# Patient Record
Sex: FEMALE | Race: WHITE | NOT HISPANIC OR LATINO | Employment: OTHER
[De-identification: names, ages, dates, MRNs, and addresses within clinical notes are randomized per-mention and may not be internally consistent; named-entity substitution may affect disease eponyms.]

---

## 2017-11-01 ENCOUNTER — RECORDS - HEALTHEAST (OUTPATIENT)
Dept: ADMINISTRATIVE | Facility: OTHER | Age: 52
End: 2017-11-01

## 2017-11-06 ENCOUNTER — HOSPITAL ENCOUNTER (OUTPATIENT)
Dept: NUCLEAR MEDICINE | Facility: HOSPITAL | Age: 52
Discharge: HOME | End: 2017-11-06
Attending: ORTHOPAEDIC SURGERY

## 2017-11-06 DIAGNOSIS — R07.81 RIB PAIN: ICD-10-CM

## 2018-05-23 ENCOUNTER — TRANSFERRED RECORDS (OUTPATIENT)
Dept: HEALTH INFORMATION MANAGEMENT | Facility: CLINIC | Age: 53
End: 2018-05-23

## 2018-05-26 ENCOUNTER — TRANSFERRED RECORDS (OUTPATIENT)
Dept: HEALTH INFORMATION MANAGEMENT | Facility: CLINIC | Age: 53
End: 2018-05-26

## 2018-05-29 ENCOUNTER — TRANSFERRED RECORDS (OUTPATIENT)
Dept: HEALTH INFORMATION MANAGEMENT | Facility: CLINIC | Age: 53
End: 2018-05-29

## 2018-07-02 ENCOUNTER — TELEPHONE (OUTPATIENT)
Dept: ORTHOPEDICS | Facility: CLINIC | Age: 53
End: 2018-07-02

## 2018-07-02 NOTE — TELEPHONE ENCOUNTER
Melony called in wanting to schedule with . She states that she has had multiple compression fractures. She has seen  and he is referring to  for osteoporosis. She reports never having a DEXA but will bring MRI imaging prior to her appointment.

## 2018-07-07 NOTE — TELEPHONE ENCOUNTER
FUTURE VISIT INFORMATION      FUTURE VISIT INFORMATION:    Date: 7/12/18    Time: 1:00    Location:   REFERRAL INFORMATION:    Referring provider:  MIKKI GARLAND    Referring providers clinic:  [facility]    Reason for visit/diagnosis: Osteoporosis    RECORDS REQUESTED FROM:       Clinic name Comments Records Status Imaging Status   [facility] REQUESTED Records recevied                                    RECORDS STATUS

## 2018-07-12 ENCOUNTER — RADIANT APPOINTMENT (OUTPATIENT)
Dept: GENERAL RADIOLOGY | Facility: CLINIC | Age: 53
End: 2018-07-12
Attending: FAMILY MEDICINE
Payer: COMMERCIAL

## 2018-07-12 ENCOUNTER — OFFICE VISIT (OUTPATIENT)
Dept: ORTHOPEDICS | Facility: CLINIC | Age: 53
End: 2018-07-12
Payer: COMMERCIAL

## 2018-07-12 ENCOUNTER — PRE VISIT (OUTPATIENT)
Dept: ORTHOPEDICS | Facility: CLINIC | Age: 53
End: 2018-07-12

## 2018-07-12 VITALS — WEIGHT: 156 LBS | BODY MASS INDEX: 24.48 KG/M2 | RESPIRATION RATE: 16 BRPM | HEIGHT: 67 IN

## 2018-07-12 DIAGNOSIS — Z87.11 HX OF GASTRIC ULCER: ICD-10-CM

## 2018-07-12 DIAGNOSIS — Z13.21 ENCOUNTER FOR VITAMIN DEFICIENCY SCREENING: ICD-10-CM

## 2018-07-12 DIAGNOSIS — S22.000A CLOSED COMPRESSION FRACTURE OF THORACIC VERTEBRA, INITIAL ENCOUNTER (H): ICD-10-CM

## 2018-07-12 DIAGNOSIS — Z13.21 ENCOUNTER FOR VITAMIN DEFICIENCY SCREENING: Primary | ICD-10-CM

## 2018-07-12 DIAGNOSIS — R79.89 LOW THYROID STIMULATING HORMONE (TSH) LEVEL: ICD-10-CM

## 2018-07-12 LAB
ANION GAP SERPL CALCULATED.3IONS-SCNC: 6 MMOL/L (ref 3–14)
BUN SERPL-MCNC: 13 MG/DL (ref 7–30)
CALCIUM SERPL-MCNC: 9.8 MG/DL (ref 8.5–10.1)
CHLORIDE SERPL-SCNC: 105 MMOL/L (ref 94–109)
CO2 SERPL-SCNC: 28 MMOL/L (ref 20–32)
CREAT SERPL-MCNC: 0.59 MG/DL (ref 0.52–1.04)
ERYTHROCYTE [DISTWIDTH] IN BLOOD BY AUTOMATED COUNT: 12.3 % (ref 10–15)
GFR SERPL CREATININE-BSD FRML MDRD: >90 ML/MIN/1.7M2
GLUCOSE SERPL-MCNC: 97 MG/DL (ref 70–99)
HCT VFR BLD AUTO: 38.8 % (ref 35–47)
HGB BLD-MCNC: 12.6 G/DL (ref 11.7–15.7)
MCH RBC QN AUTO: 29.4 PG (ref 26.5–33)
MCHC RBC AUTO-ENTMCNC: 32.5 G/DL (ref 31.5–36.5)
MCV RBC AUTO: 91 FL (ref 78–100)
PLATELET # BLD AUTO: 359 10E9/L (ref 150–450)
POTASSIUM SERPL-SCNC: 4.4 MMOL/L (ref 3.4–5.3)
PTH-INTACT SERPL-MCNC: 19 PG/ML (ref 18–80)
RBC # BLD AUTO: 4.28 10E12/L (ref 3.8–5.2)
SODIUM SERPL-SCNC: 140 MMOL/L (ref 133–144)
TSH SERPL DL<=0.005 MIU/L-ACNC: 0.04 MU/L (ref 0.4–4)
WBC # BLD AUTO: 5.2 10E9/L (ref 4–11)

## 2018-07-12 RX ORDER — CALCITONIN SALMON 200 [IU]/.09ML
1 SPRAY, METERED NASAL DAILY
Qty: 1 BOTTLE | Refills: 1 | Status: SHIPPED | OUTPATIENT
Start: 2018-07-12 | End: 2019-04-18

## 2018-07-12 RX ORDER — HYDROCODONE BITARTRATE AND ACETAMINOPHEN 5; 325 MG/1; MG/1
1 TABLET ORAL EVERY 4 HOURS PRN
Qty: 18 TABLET | Refills: 0 | Status: SHIPPED | OUTPATIENT
Start: 2018-07-12 | End: 2018-08-02

## 2018-07-12 NOTE — PATIENT INSTRUCTIONS
Use the IOF calculator: ([employer] iofcalciumcalculator) to check on your calcium    You should start taking 1000IU of Muriel D daily    Calcitonin nasal spray can help with pain in your back, its a nasal spray do one spray each day (one nostril one day, other next day)     Labs downstairs and schedule the DXA scan.    Come back for a visit to discuss our plan and lab results.

## 2018-07-12 NOTE — Clinical Note
I was going to add on T3 and T4 but the lab specimen was thrown after 3 days. I think that is what I can check on with her next visit?
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
## 2018-07-12 NOTE — MR AVS SNAPSHOT
After Visit Summary   7/12/2018    Lucia Becerra    MRN: [id number]           Patient Information     Date Of Birth          1965        Visit Information        Provider Department      7/12/2018 1:00 PM Conrad Shipley MD [facility] Medicine        Today's Diagnoses     Encounter for vitamin deficiency screening    -  1    Closed compression fracture of thoracic vertebra, initial encounter (H)        Hx of gastric ulcer          Care Instructions    Use the IOF calculator: (Barspace iofcalciumcalculator) to check on your calcium    You should start taking 1000IU of Muriel D daily    Calcitonin nasal spray can help with pain in your back, its a nasal spray do one spray each day (one nostril one day, other next day)     Labs downstairs and schedule the DXA scan.    Come back for a visit to discuss our plan and lab results.           Follow-ups after your visit        Your next 10 appointments already scheduled     Jul 16, 2018  1:30 PM CDT   DX HIP/PELVIS/SPINE with UCDX1   [facility] ([facility])    [address] [place] [address]  1st [facility] [address] [phone]           Please do not take any of the following 24 hours prior to the day of your exam: vitamins, calcium tablets, antacids.  If possible, please wear clothes without metal (snaps, zippers). A sweatsuit works well.            Aug 02, 2018  1:40 PM CDT   (Arrive by 1:25 PM)   Return Visit with Conrad Shipley MD   [facility] ([facility])    [address] [place] [address]  5th [facility] [id number] [phone]              Future tests that were ordered for you today     Open Future Orders        Priority Expected Expires Ordered    TSH Routine 7/12/2018 9/10/2018 7/12/2018    Basic metabolic panel Routine 7/12/2018 8/11/2018 7/12/2018    Vitamin D Deficiency Routine 7/12/2018 8/11/2018 7/12/2018    Dexa hip/pelvis/spine* Routine   "7/12/2019 7/12/2018    CBC with platelets Routine 7/12/2018 8/11/2018 7/12/2018            Who to contact     Please call your clinic at [phone] to:    Ask questions about your health    Make or cancel appointments    Discuss your medicines    Learn about your test results    Speak to your doctor            Additional Information About Your Visit        [employer] Information     [employer] gives you secure access to your electronic health record. If you see a primary care provider, you can also send messages to your care team and make appointments. If you have questions, please call your primary care clinic.  If you do not have a primary care provider, please call [phone] and they will assist you.      [employer]ion is an electronic gateway that provides easy, online access to your medical records. With [employer], you can request a clinic appointment, read your test results, renew a prescription or communicate with your care team.     To access your existing account, please contact your [facility] or call [phone] for assistance.        Care EveryWhere ID     This is your Care EveryWhere ID. This could be used by other organizations to access your [facility] medical records  [id number]        Your Vitals Were     Respirations Height BMI (Body Mass Index)             16 5' 6.5\" (1.689 m) 24.8 kg/m2          Blood Pressure from Last 3 Encounters:   No data found for BP    Weight from Last 3 Encounters:   07/12/18 156 lb (70.8 kg)              We Performed the Following     Parathyroid Hormone Intact          Today's Medication Changes          These changes are accurate as of 7/12/18  2:50 PM.  If you have any questions, ask your nurse or doctor.               Start taking these medicines.        Dose/Directions    calcitonin (salmon) 200 UNIT/ACT nasal spray   Commonly known as:  MIACALCIN   Used for:  Closed compression fracture of thoracic vertebra, initial encounter (H)   Started " by:  Conrad Shipley MD        Dose:  1 spray   Spray 1 spray into one nostril alternating nostrils daily Alternate nostril each day.   Quantity:  1 Bottle   Refills:  1       HYDROcodone-acetaminophen 5-325 MG per tablet   Commonly known as:  NORCO   Used for:  Closed compression fracture of thoracic vertebra, initial encounter (H)   Started by:  Conrad Shipley MD        Dose:  1 tablet   Take 1 tablet by mouth every 4 hours as needed for pain   Quantity:  18 tablet   Refills:  0            Where to get your medicines      Some of these will need a paper prescription and others can be bought over the counter.  Ask your nurse if you have questions.     Bring a paper prescription for each of these medications     calcitonin (salmon) 200 UNIT/ACT nasal spray    HYDROcodone-acetaminophen 5-325 MG per tablet               Information about OPIOIDS     PRESCRIPTION OPIOIDS: WHAT YOU NEED TO KNOW   We gave you an opioid (narcotic) pain medicine. It is important to manage your pain, but opioids are not always the best choice. You should first try all the other options your care team gave you. Take this medicine for as short a time (and as few doses) as possible.     These medicines have risks:    DO NOT drive when on new or higher doses of pain medicine. These medicines can affect your alertness and reaction times, and you could be arrested for driving under the influence (DUI). If you need to use opioids long-term, talk to your care team about driving.    DO NOT operate heave machinery    DO NOT do any other dangerous activities while taking these medicines.     DO NOT drink any alcohol while taking these medicines.      If the opioid prescribed includes acetaminophen, DO NOT take with any other medicines that contain acetaminophen. Read all labels carefully. Look for the word  acetaminophen  or  Tylenol.  Ask your pharmacist if you have questions or are unsure.    You can get addicted to pain medicines,  especially if you have a history of addiction (chemical, alcohol or substance dependence). Talk to your care team about ways to reduce this risk.    Store your pills in a secure place, locked if possible. We will not replace any lost or stolen medicine. If you don t finish your medicine, please throw away (dispose) as directed by your pharmacist. The [place] Pollution Control Agency has more information about safe disposal: https://www.pca.[facility].mn.us/living-green/managing-unwanted-medications.     All opioids tend to cause constipation. Drink plenty of water and eat foods that have a lot of fiber, such as fruits, vegetables, prune juice, apple juice and high-fiber cereal. Take a laxative (Miralax, milk of magnesia, Colace, Senna) if you don t move your bowels at least every other day.          Primary Care Provider    None Specified       No primary provider on file.        Equal Access to Services     MARTIN TOUSSAINT : Evert Rousseau, newton thomas, ravin millan, shari de la paz . So [facility] [phone].    ATENCIÓN: Si habla español, tiene a dangelo disposición servicios gratuitos de asistencia lingüística. Llame al [phone].    We comply with applicable federal civil rights laws and [place] laws. We do not discriminate on the basis of race, color, national origin, age, disability, sex, sexual orientation, or gender identity.            Thank you!     Thank you for choosing [facility]  for your care. Our goal is always to provide you with excellent care. Hearing back from our patients is one way we can continue to improve our services. Please take a few minutes to complete the written survey that you may receive in the mail after your visit with us. Thank you!             Your Updated Medication List - Protect others around you: Learn how to safely use, store and throw away your medicines at www.disposemymeds.org.          This list is accurate as  of 7/12/18  2:50 PM.  Always use your most recent med list.                   Brand Name Dispense Instructions for use Diagnosis    calcitonin (salmon) 200 UNIT/ACT nasal spray    MIACALCIN    1 Bottle    Spray 1 spray into one nostril alternating nostrils daily Alternate nostril each day.    Closed compression fracture of thoracic vertebra, initial encounter (H)       [employer] FISH OIL PO           FLAXSEED (LINSEED) PO           FLUoxetine 20 MG capsule    PROzac          HYDROcodone-acetaminophen 5-325 MG per tablet    NORCO    18 tablet    Take 1 tablet by mouth every 4 hours as needed for pain    Closed compression fracture of thoracic vertebra, initial encounter (H)       VITAMIN B COMPLEX PO           VITAMIN C PO

## 2018-07-16 ENCOUNTER — RADIANT APPOINTMENT (OUTPATIENT)
Dept: BONE DENSITY | Facility: CLINIC | Age: 53
End: 2018-07-16
Attending: FAMILY MEDICINE
Payer: COMMERCIAL

## 2018-07-16 DIAGNOSIS — S22.000A CLOSED COMPRESSION FRACTURE OF THORACIC VERTEBRA, INITIAL ENCOUNTER (H): ICD-10-CM

## 2018-07-16 LAB — DEPRECATED CALCIDIOL+CALCIFEROL SERPL-MC: 37 UG/L (ref 20–75)

## 2018-07-17 LAB — T4 FREE SERPL-MCNC: 0.3 NG/DL (ref 0.76–1.46)

## 2018-07-25 ENCOUNTER — TELEPHONE (OUTPATIENT)
Dept: ORTHOPEDICS | Facility: CLINIC | Age: 53
End: 2018-07-25

## 2018-07-25 ENCOUNTER — HEALTH MAINTENANCE LETTER (OUTPATIENT)
Age: 53
End: 2018-07-25

## 2018-07-25 DIAGNOSIS — E03.8 CENTRAL HYPOTHYROIDISM: Primary | ICD-10-CM

## 2018-07-25 NOTE — TELEPHONE ENCOUNTER
Called Lucia with her lab results, no answer so I left a message.       Her thyroid test shows that her thyroid might be low. I have another test that I want her to get ACTH stim test. I am not sure all the details about administering it alternately I recommend that she follow up with a endocrinologist to check in for this test and to check if she needs to be started on the thyroid hormone.     I asked her to call the [facility] Medicine Clinic back at [phone] and speak with a nursing team member to discuss this. I can try to call her in a few weeks.     She is scheduled to follow up with me in ~1-2 weeks and we will discuss more then But I think she should get these labs before that appointment.     Conrad Shipley MD on 7/25/2018 at 5:18 PM

## 2018-08-01 NOTE — PROGRESS NOTES
Attending Note:   I have personally examined this patient and have reviewed the clinical presentation and progress note with the fellow. I agree with the treatment plan as outlined. The plan was formulated with the fellow on the day of the patient's visit. I have reviewed all imaging with the fellow and agree with the findings in the documentation.     Bea Riddle MD, CAQ, CCD  [facility]  Sports Medicine and Bone Health

## 2018-08-02 ENCOUNTER — OFFICE VISIT (OUTPATIENT)
Dept: ORTHOPEDICS | Facility: CLINIC | Age: 53
End: 2018-08-02
Payer: COMMERCIAL

## 2018-08-02 DIAGNOSIS — S22.000D CLOSED COMPRESSION FRACTURE OF THORACIC VERTEBRA WITH ROUTINE HEALING, SUBSEQUENT ENCOUNTER: ICD-10-CM

## 2018-08-02 DIAGNOSIS — R79.89 LOW THYROID STIMULATING HORMONE (TSH) LEVEL: ICD-10-CM

## 2018-08-02 DIAGNOSIS — E03.8 CENTRAL HYPOTHYROIDISM: ICD-10-CM

## 2018-08-02 DIAGNOSIS — E03.8 CENTRAL HYPOTHYROIDISM: Primary | ICD-10-CM

## 2018-08-02 LAB
FSH SERPL-ACNC: 100.1 IU/L
LH SERPL-ACNC: 23.8 IU/L
PROLACTIN SERPL-MCNC: 5 UG/L (ref 3–27)
T3FREE SERPL-MCNC: 2 PG/ML (ref 2.3–4.2)

## 2018-08-02 NOTE — LETTER
"  8/2/2018      RE: Lucia Becerra  [address]  [facility] [address]       SUBJECTIVE:    Lucia Becerra is a 52 year old female here for follow up after lab testing and DXA. Her  was present for this visit.    Date of injury was 5/14/2018.  Horseback riding in preparation for a trip to [place] that involved horseback riding.  Outside records reviewed today revealed further details of her previous compression fracture and rib fractures documentation for further details.    Since her last visit her pain has improved.  She ran out of pain medication (norco) after 1 week and used 2 Advil nightly to help with pain \"I know I am not supposed to take advil\"  She estimates she is 30% better than prior. Didn't use the calcitonin very much but is willing to give it a try again.     When questioned further about her level of activity, and thyroid she endorses that she has been \"extremely tired for the last several years\" she can sleep for days at a time which is unusual and a change for her compared to years ago.  Her periods were normal and she conceived children naturally in the past.     Review of systems  6 point review of systems is other wise negative (except for noted in HPI    Current medications are Prozac 20 mg daily and Advil as above  Working on calcium intake given her compression fractures     Past med hx:   2013: T10 compression fracture   2018: current fracture: t7 and t8 endplate plus t8 and t9 transverse processes and pedicle fractures      Family Hx   Maternal grandmother some sort of pituitary disease, had brain surgery and pain.   Mom eating disorder  Sister also has osteoporosis and eating disorder        Objective     Past Medical History:   Diagnosis Date     Compression fracture of thoracic vertebra (H)     2013 t10 and 2018 t7 and t8     Heartburn        No past surgical history on file.    Current Outpatient Prescriptions   Medication Sig Dispense Refill     VITAMIN D, CHOLECALCIFEROL, " PO Take 1,000 Units by mouth daily       Ascorbic Acid (VITAMIN C PO)        B Complex Vitamins (VITAMIN B COMPLEX PO)        calcitonin, salmon, (MIACALCIN) 200 UNIT/ACT nasal spray Spray 1 spray into one nostril alternating nostrils daily Alternate nostril each day. 1 Bottle 1     FLAXSEED, LINSEED, PO        FLUoxetine (PROZAC) 20 MG capsule        Omega-3 Fatty Acids ([employer] FISH OIL PO)          Family History   Problem Relation Age of Onset     Osteoperosis Mother      Osteoperosis Sister        Social History     Social History     Marital status:      Spouse name: N/A     Number of children: N/A     Years of education: N/A     Occupational History     Not on file.     Social History Main Topics     Smoking status: Never Smoker     Smokeless tobacco: Never Used     Alcohol use Yes      Comment: 0-1 a week     Drug use: No     Sexual activity: Not on file     Other Topics Concern     Not on file     Social History Narrative       OBJECTIVE:  Gen: normal appearance, in no obvious distress  Problem: Normal respiratory pattern  Skin: no ecchymosis, erythema, warmth, or induration, no obvious rash  Psych: interactive, appropriate, normal mood and affect           Imaging from outside documents:   CT Spine: 5/2018:   1. Mild compression type fracture deformities at each level from T6 through t10.   2. no superimposed acute fractures (although difficult on CT)  3. T2-3 disc degeneration with mild dorsal bulging    MRI: 5/23/18  1. mild superior endplate fracture at T7-8 with mild marrow edema suggesting recent fractures  2. chronic mild superior at T2,T6,T9,T10  3. 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  Given her lab values nearly undetectable TSH and low T4 she likely has central hypothyroidism. T3 was checked prior to this  appointment and low which agrees with above diagnosis.  Discussed this with the on-call endocrinologist here at the [facility] who recommended workup including FSH, LH, IGF-1, prolactin today.  She needs to have an ACTH stim test prior to starting levothyroxine to concern of adrenal crisis if she started levothyroxine before that result comes back.     ACTH stim test is done at the infusion center.     I have referred her to endocrinology for oversight management and consideration for further workup. Will start her on low dose of levothyroxine after the results of the ACTH stim test (which will determine the need for steroids as well)     Patient Instructions   Infusion clinic 2nd floor [facility] [phone]. (Schedule the ACTH (cosyntropin) test )    Get the labs downstairs today    Schedule the MRI of your brain    Schedule with the endocrine clinic for appointment    Come back to see me in 2-3 months.        Patient seen and discussed with Dr. Bea Riddle.       Conrad Shipley   Sports Medicine Fellow  8/6/2018 3:51 PM    Attending Note:   I have personally examined this patient and have reviewed the clinical presentation and progress note with the fellow. I agree with the treatment plan as outlined. The plan was formulated with the fellow on the day of the patient's visit. I have reviewed all imaging and labs with the fellow and agree with the findings in the documentation.     Bea Riddle MD, CAQ, CCD  [facility]  Sports Medicine and Bone Health    Conrad Shipley MD

## 2018-08-02 NOTE — PROGRESS NOTES
"SUBJECTIVE:    Lucia Becerra is a 52 year old female here for follow up after lab testing and DXA. Her  was present for this visit.    Date of injury was 5/14/2018.  Horseback riding in preparation for a trip to [place] that involved horseback riding.  Outside records reviewed today revealed further details of her previous compression fracture and rib fractures documentation for further details.    Since her last visit her pain has improved.  She ran out of pain medication (norco) after 1 week and used 2 Advil nightly to help with pain \"I know I am not supposed to take advil\"  She estimates she is 30% better than prior. Didn't use the calcitonin very much but is willing to give it a try again.     When questioned further about her level of activity, and thyroid she endorses that she has been \"extremely tired for the last several years\" she can sleep for days at a time which is unusual and a change for her compared to years ago.  Her periods were normal and she conceived children naturally in the past.     Review of systems  6 point review of systems is other wise negative (except for noted in HPI    Current medications are Prozac 20 mg daily and Advil as above  Working on calcium intake given her compression fractures     Past med hx:   2013: T10 compression fracture   2018: current fracture: t7 and t8 endplate plus t8 and t9 transverse processes and pedicle fractures      Family Hx   Maternal grandmother some sort of pituitary disease, had brain surgery and pain.   Mom eating disorder  Sister also has osteoporosis and eating disorder        Objective     Past Medical History:   Diagnosis Date     Compression fracture of thoracic vertebra (H)     2013 t10 and 2018 t7 and t8     Heartburn        No past surgical history on file.    Current Outpatient Prescriptions   Medication Sig Dispense Refill     VITAMIN D, CHOLECALCIFEROL, PO Take 1,000 Units by mouth daily       Ascorbic Acid (VITAMIN C PO)        B " Complex Vitamins (VITAMIN B COMPLEX PO)        calcitonin, salmon, (MIACALCIN) 200 UNIT/ACT nasal spray Spray 1 spray into one nostril alternating nostrils daily Alternate nostril each day. 1 Bottle 1     FLAXSEED, LINSEED, PO        FLUoxetine (PROZAC) 20 MG capsule        Omega-3 Fatty Acids ([employer]S FISH OIL PO)          Family History   Problem Relation Age of Onset     Osteoperosis Mother      Osteoperosis Sister        Social History     Social History     Marital status:      Spouse name: N/A     Number of children: N/A     Years of education: N/A     Occupational History     Not on file.     Social History Main Topics     Smoking status: Never Smoker     Smokeless tobacco: Never Used     Alcohol use Yes      Comment: 0-1 a week     Drug use: No     Sexual activity: Not on file     Other Topics Concern     Not on file     Social History Narrative       OBJECTIVE:  Gen: normal appearance, in no obvious distress  Problem: Normal respiratory pattern  Skin: no ecchymosis, erythema, warmth, or induration, no obvious rash  Psych: interactive, appropriate, normal mood and affect           Imaging from outside documents:   CT Spine: 5/2018:   1. Mild compression type fracture deformities at each level from T6 through t10.   2. no superimposed acute fractures (although difficult on CT)  3. T2-3 disc degeneration with mild dorsal bulging    MRI: 5/23/18  1. mild superior endplate fracture at T7-8 with mild marrow edema suggesting recent fractures  2. chronic mild superior at T2,T6,T9,T10  3. 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  Given her lab values nearly undetectable TSH and low T4 she likely has central hypothyroidism. T3 was checked prior to this appointment and low which agrees with above diagnosis.  Discussed this with the  on-call endocrinologist here at the [place] [facility] who recommended workup including FSH, LH, IGF-1, prolactin today.  She needs to have an ACTH stim test prior to starting levothyroxine to concern of adrenal crisis if she started levothyroxine before that result comes back.     ACTH stim test is done at the infusion center.     I have referred her to endocrinology for oversight management and consideration for further workup. Will start her on low dose of levothyroxine after the results of the ACTH stim test (which will determine the need for steroids as well)     Patient Instructions   Infusion clinic 2nd floor [facility] [phone]. (Schedule the ACTH (cosyntropin) test )    Get the labs downstairs today    Schedule the MRI of your brain    Schedule with the endocrine clinic for appointment    Come back to see me in 2-3 months.        Patient seen and discussed with Dr. Bea Riddle.       Conrad Shipley   Sports Medicine Fellow  8/6/2018 3:51 PM

## 2018-08-02 NOTE — PROGRESS NOTES
Attending Note:   I have personally examined this patient and have reviewed the clinical presentation and progress note with the fellow. I agree with the treatment plan as outlined. The plan was formulated with the fellow on the day of the patient's visit. I have reviewed all imaging and labs with the fellow and agree with the findings in the documentation.     Bea Riddle MD, CAQ, CCD  [facility]  Sports Medicine and Bone Health

## 2018-08-02 NOTE — PATIENT INSTRUCTIONS
Infusion clinic 2nd floor [facility] [phone]. (Schedule the ACTH (cosyntropin) test )    Get the labs downstairs today    Schedule the MRI of your brain    Schedule with the endocrine clinic for appointment    Come back to see me in 2-3 months.

## 2018-08-02 NOTE — MR AVS SNAPSHOT
After Visit Summary   8/2/2018    Lucia Becerra    MRN: [id number]           Patient Information     Date Of Birth          1965        Visit Information        Provider Department      8/2/2018 1:40 PM Conrad Shipley MD [facility]        Today's Diagnoses     Central hypothyroidism    -  1    Closed compression fracture of thoracic vertebra with routine healing, subsequent encounter          Care Instructions    [place] 2nd [place] [facility] [place] [phone]. (Schedule the ACTH (cosyntropin) test )    Get the labs downstairs today    Schedule the MRI of your brain    Schedule with the endocrine clinic for appointment    Come back to see me in 2-3 months.           Follow-ups after your visit        Additional Services     ENDOCRINOLOGY ADULT REFERRAL       Your provider has referred you to: [facility]: Endocrinology and Diabetes Clinic - [place] [phone]   [email or web address]      Please be aware that coverage of these services is subject to the terms and limitations of your health insurance plan.  Call member services at your health plan with any benefit or coverage questions.      Please bring the following to your appointment:    >>   Any x-rays, CTs or MRIs which have been performed.  Contact the facility where they were done to arrange for  prior to your scheduled appointment.    >>   List of current medications   >>   This referral request   >>   Any documents/labs given to you for this referral                  Your next 10 appointments already scheduled     Aug 09, 2018  7:00 AM CDT    PITKEELYUTARY W AND W/O CONTRAST with [id number]   [facility] ([facility])    [address]  1st Floor  [place] [address] [phone]              Future tests that were ordered for you today     Open Future Orders        Priority Expected Expires Ordered    MR Pituitary w  and w/o contrast Routine  8/2/2019 8/2/2018    Follicle stimulating hormone Routine 8/2/2018 10/1/2018 8/2/2018    Lutropin Routine 8/2/2018 10/1/2018 8/2/2018    Prolactin Routine  8/3/2019 8/2/2018    IgF binding protein 1 Routine  8/3/2019 8/2/2018            Who to contact     Please call your clinic at [phone] to:    Ask questions about your health    Make or cancel appointments    Discuss your medicines    Learn about your test results    Speak to your doctor            Additional Information About Your Visit        [employer] Information     [employer] gives you secure access to your electronic health record. If you see a primary care provider, you can also send messages to your care team and make appointments. If you have questions, please call your primary care clinic.  If you do not have a primary care provider, please call [phone] and they will assist you.      [employer] is an electronic gateway that provides easy, online access to your medical records. With [employer], you can request a clinic appointment, read your test results, renew a prescription or communicate with your care team.     To access your existing account, please contact your [facility] or call [phone] for assistance.        Care EveryWhere ID     This is your Care EveryWhere ID. This could be used by other organizations to access your [facility] medical records  [id number]         Blood Pressure from Last 3 Encounters:   No data found for BP    Weight from Last 3 Encounters:   07/12/18 156 lb (70.8 kg)              We Performed the Following     ENDOCRINOLOGY ADULT REFERRAL          Today's Medication Changes          These changes are accurate as of 8/2/18  3:00 PM.  If you have any questions, ask your nurse or doctor.               Stop taking these medicines if you haven't already. Please contact your care team if you have questions.     HYDROcodone-acetaminophen 5-325 MG per tablet   Commonly known  as:  LEONIE   Stopped by:  Conrad Shipley MD                    Primary Care Provider    None Specified       No primary provider on file.        Equal Access to Services     [facility] : Hadii aad ku hadedithsawyer Rousseau, ryleeallyssa ponceeboniha, ravin sharmilamartita artemcarlos, shari kalyaniin hayaajoshua mcgillalejandro vaughntammyraymon wing. So [facility] [phone].    ATENCIÓN: Si habla español, tiene a dangelo disposición servicios gratuitos de asistencia lingüística. Llame al [phone].    We comply with applicable federal civil rights laws and [place] laws. We do not discriminate on the basis of race, color, national origin, age, disability, sex, sexual orientation, or gender identity.            Thank you!     Thank you for choosing [facility]  for your care. Our goal is always to provide you with excellent care. Hearing back from our patients is one way we can continue to improve our services. Please take a few minutes to complete the written survey that you may receive in the mail after your visit with us. Thank you!             Your Updated Medication List - Protect others around you: Learn how to safely use, store and throw away your medicines at www.disposemymeds.org.          This list is accurate as of 8/2/18  3:00 PM.  Always use your most recent med list.                   Brand Name Dispense Instructions for use Diagnosis    calcitonin (salmon) 200 UNIT/ACT nasal spray    MIACALCIN    1 Bottle    Spray 1 spray into one nostril alternating nostrils daily Alternate nostril each day.    Closed compression fracture of thoracic vertebra, initial encounter (H)       [facility] FISH OIL PO           FLAXSEED (LINSEED) PO           FLUoxetine 20 MG capsule    PROzac          VITAMIN B COMPLEX PO           VITAMIN C PO           VITAMIN D (CHOLECALCIFEROL) PO      Take 1,000 Units by mouth daily

## 2018-08-09 ENCOUNTER — TELEPHONE (OUTPATIENT)
Dept: ENDOCRINOLOGY | Facility: CLINIC | Age: 53
End: 2018-08-09

## 2018-08-09 ENCOUNTER — RADIANT APPOINTMENT (OUTPATIENT)
Dept: MRI IMAGING | Facility: CLINIC | Age: 53
End: 2018-08-09
Attending: FAMILY MEDICINE
Payer: COMMERCIAL

## 2018-08-09 DIAGNOSIS — E03.8 CENTRAL HYPOTHYROIDISM: ICD-10-CM

## 2018-08-09 RX ORDER — GADOBUTROL 604.72 MG/ML
7.5 INJECTION INTRAVENOUS ONCE
Status: COMPLETED | OUTPATIENT
Start: 2018-08-09 | End: 2018-08-09

## 2018-08-09 RX ADMIN — GADOBUTROL 7.5 ML: 604.72 INJECTION INTRAVENOUS at 07:45

## 2018-08-09 NOTE — TELEPHONE ENCOUNTER
----- Message from Jayne Collado MD sent at 8/9/2018 10:41 AM CDT -----  Regarding: review of record  We spoke about her on the phone 8/2/18 and I placed the order for the cortrosyn stim test since it was too hard to explain how to place a therapy plan order via telephone.    I reviewed the record today .  I see a couple of errors.   The insulin like growth factor 1 that I recommended is a different test than the insulin like growth factor binding protein which you ordered.  The total T3 that I recommended is a different test than the free T3 you ordered. She will probably need a redraw in order to get the correct tests.  When you do this, also repeat the TSH and free T4.    I see she has a high FSH as we would expect in a post menopausal woman .  I have reviewed the 8/9/18 pituitary MRI which shows normal appearing pituitary, no tumor.    It appears the cortrosyn stim test has not yet been done.    Jayne Collado MD  Endocrine triage.

## 2018-08-13 ENCOUNTER — TELEPHONE (OUTPATIENT)
Dept: ORTHOPEDICS | Facility: CLINIC | Age: 53
End: 2018-08-13

## 2018-08-13 DIAGNOSIS — E03.8 CENTRAL HYPOTHYROIDISM: Primary | ICD-10-CM

## 2018-08-13 LAB — LAB SCANNED RESULT: ABNORMAL

## 2018-08-13 NOTE — TELEPHONE ENCOUNTER
Please try to call Melony later today with this message:    Called and left message for Melony, her MRI result came back with no concerns on the MRI. No tumor or other reason we can see with MRI that this has changed.    Her other labs that we got so far to not reveal that her pituitary is not functioning. Meaning that some of the pituitary glad IS working that we can tell right now. We still do not know about the adrenal ACTH system until the test later this week.     The endocrinologist reviewed these labs with me.       Conrad Shipley MD on 8/13/2018 at 8:42 AM

## 2018-08-14 ENCOUNTER — INFUSION THERAPY VISIT (OUTPATIENT)
Dept: INFUSION THERAPY | Facility: CLINIC | Age: 53
End: 2018-08-14
Payer: COMMERCIAL

## 2018-08-14 VITALS — DIASTOLIC BLOOD PRESSURE: 84 MMHG | SYSTOLIC BLOOD PRESSURE: 131 MMHG | TEMPERATURE: 97.4 F | HEART RATE: 84 BPM

## 2018-08-14 DIAGNOSIS — E03.8 CENTRAL HYPOTHYROIDISM: Primary | ICD-10-CM

## 2018-08-14 LAB
CORTICOSTER 1H P 250 UG ACTH SERPL-SCNC: 26 UG/DL
CORTICOSTER 30M P 250 UG ACTH SERPL-SCNC: 22.5 UG/DL
CORTICOSTER SERPL-MCNC: 16.3 UG/DL (ref 4–22)

## 2018-08-14 PROCEDURE — 82533 TOTAL CORTISOL: CPT | Mod: 91

## 2018-08-14 PROCEDURE — 25000128 H RX IP 250 OP 636: Mod: ZF

## 2018-08-14 PROCEDURE — 96374 THER/PROPH/DIAG INJ IV PUSH: CPT

## 2018-08-14 PROCEDURE — 82024 ASSAY OF ACTH: CPT

## 2018-08-14 RX ADMIN — COSYNTROPIN 1 MCG: 0.25 INJECTION, POWDER, LYOPHILIZED, FOR SOLUTION INTRAMUSCULAR; INTRAVENOUS at 08:51

## 2018-08-14 NOTE — PROGRESS NOTES
Cosyntropin Stimulation Study Nursing Note    Lucia Becerra comes to [facility] today for a ACTH timed test. Pt's vitals recorded  and entered into flow sheet. Medications recorded on MAR. Access recorded in flow sheet.    Progress Note    Vitals were reviewed     Patient tolerated the procedure well    Note:   RN provided patient with educational handout regarding timed test.  RN confirmed patient did not take steroids (hydrocortisone) on the morning of the test. PIV placed and  baseline labs drawn. RN administered Cortrosyn per IV push at 4268-9925, followed by 10 mls NS. Cortisol labs drawn again at 30 minutes (0924) and 60 minutes (0954). Following test patient instructed to take usual dose of hydrocortisone for the day.    Medication given:  Administrations This Visit     cosyntropin (CORTROSYN) in NS injection (LOW-DOSE) 1 mcg     Admin Date Action Dose Route Administered By             08/14/2018 Given 1 mcg Intravenous Kristie Flores, RN                          Discharge Plan    Discharge instructions reviewed with patient: YES  Patient/Representative verbalized understanding, all questions answered: YES    Discharged from unit at 1000 with whom: self to home.    rKistie Flores, RN

## 2018-08-16 LAB — ACTH PLAS-MCNC: 18 PG/ML

## 2018-08-16 ASSESSMENT — ENCOUNTER SYMPTOMS
TINGLING: 1
STIFFNESS: 0
ARTHRALGIAS: 0
EYE IRRITATION: 1
HYPOTENSION: 0
BACK PAIN: 1
EYE REDNESS: 0
ABDOMINAL PAIN: 0
ORTHOPNEA: 0
MUSCLE WEAKNESS: 1
DOUBLE VISION: 0
SINUS CONGESTION: 0
INCREASED ENERGY: 1
TREMORS: 0
EYE PAIN: 0
HOARSE VOICE: 1
BOWEL INCONTINENCE: 0
HYPERTENSION: 1
NECK PAIN: 0
MEMORY LOSS: 0
SYNCOPE: 0
BLOATING: 0
SINUS PAIN: 0
NAIL CHANGES: 1
TASTE DISTURBANCE: 1
MUSCLE CRAMPS: 0
CHILLS: 1
FATIGUE: 1
LIGHT-HEADEDNESS: 0
EYE WATERING: 0
MYALGIAS: 1
POLYDIPSIA: 0
WEIGHT GAIN: 0
POOR WOUND HEALING: 0
SORE THROAT: 1
NECK MASS: 1
SKIN CHANGES: 0
SEIZURES: 0
PALPITATIONS: 1
EXERCISE INTOLERANCE: 0
JAUNDICE: 0
SPEECH CHANGE: 0
VOMITING: 0
JOINT SWELLING: 0
ALTERED TEMPERATURE REGULATION: 0
PARALYSIS: 0
FEVER: 0
DISTURBANCES IN COORDINATION: 0
NAUSEA: 1
SLEEP DISTURBANCES DUE TO BREATHING: 0
HEARTBURN: 1
WEAKNESS: 1
RECTAL PAIN: 0
DIARRHEA: 0
TROUBLE SWALLOWING: 1
DECREASED APPETITE: 0
CONSTIPATION: 1
HEADACHES: 1
POLYPHAGIA: 0
DIZZINESS: 0
NIGHT SWEATS: 1
LEG PAIN: 0
SMELL DISTURBANCE: 0
WEIGHT LOSS: 0
BLOOD IN STOOL: 0
NUMBNESS: 1
LOSS OF CONSCIOUSNESS: 0
HALLUCINATIONS: 0

## 2018-08-21 ENCOUNTER — PATIENT OUTREACH (OUTPATIENT)
Dept: CARE COORDINATION | Facility: CLINIC | Age: 53
End: 2018-08-21

## 2018-08-21 NOTE — TELEPHONE ENCOUNTER
I did talk to dianne about the results of the ACTH stim test. She had an appropriate amount cortisol at baseline and had a small but appropriate increase with the 1 Mcg dose of cosyntropin.     I think she should start on thyroid medicine but will defer the dose and further testing until her appointment later this week with endocrinology.     Conrad Shipley MD on 8/21/2018 at 4:41 PM

## 2018-08-23 ENCOUNTER — OFFICE VISIT (OUTPATIENT)
Dept: ENDOCRINOLOGY | Facility: CLINIC | Age: 53
End: 2018-08-23
Payer: COMMERCIAL

## 2018-08-23 VITALS
BODY MASS INDEX: 26.23 KG/M2 | HEART RATE: 88 BPM | HEIGHT: 66 IN | WEIGHT: 163.2 LBS | DIASTOLIC BLOOD PRESSURE: 85 MMHG | SYSTOLIC BLOOD PRESSURE: 125 MMHG

## 2018-08-23 DIAGNOSIS — S22.000S CLOSED COMPRESSION FRACTURE OF THORACIC VERTEBRA, SEQUELA: ICD-10-CM

## 2018-08-23 DIAGNOSIS — E03.8 CENTRAL HYPOTHYROIDISM: ICD-10-CM

## 2018-08-23 DIAGNOSIS — E03.8 CENTRAL HYPOTHYROIDISM: Primary | ICD-10-CM

## 2018-08-23 LAB
T3 SERPL-MCNC: 95 NG/DL (ref 60–181)
T3FREE SERPL-MCNC: 3 PG/ML (ref 2.3–4.2)
T4 FREE SERPL-MCNC: 0.3 NG/DL (ref 0.76–1.46)
TSH SERPL DL<=0.005 MIU/L-ACNC: 0.2 MU/L (ref 0.4–4)

## 2018-08-23 ASSESSMENT — PAIN SCALES - GENERAL: PAINLEVEL: NO PAIN (0)
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
## 2018-08-23 NOTE — PATIENT INSTRUCTIONS
Labs today, I will contact you with labs result and we will consider replacement  Slowly weight bearing exercise (walking)

## 2018-08-23 NOTE — MR AVS SNAPSHOT
After Visit Summary   8/23/2018    Lucia Becerra    MRN: [id number]           Patient Information     Date Of Birth          1965        Visit Information        Provider Department      8/23/2018 10:40 AM Ken Harvey MD [facility]        Today's Diagnoses     Central hypothyroidism    -  1    Closed compression fracture of thoracic vertebra, sequela          Care Instructions    Labs today, I will contact you with labs result and we will consider replacement  Slowly weight bearing exercise (walking)          Follow-ups after your visit        Follow-up notes from your care team     Return in about 6 weeks (around 10/4/2018).      Your next 10 appointments already scheduled     Oct 18, 2018 10:10 AM CDT   (Arrive by 9:55 AM)   RETURN ENDOCRINE with MD [facility] ([facility])    [address] [place] [id number] [phone]              Who to contact     Please call your clinic at [phone] to:    Ask questions about your health    Make or cancel appointments    Discuss your medicines    Learn about your test results    Speak to your doctor            Additional Information About Your Visit        MyChart Information     [employer] gives you secure access to your electronic health record. If you see a primary care provider, you can also send messages to your care team and make appointments. If you have questions, please call your primary care clinic.  If you do not have a primary care provider, please call [phone] and they will assist you.      [employer] is an electronic gateway that provides easy, online access to your medical records. With [employer], you can request a clinic appointment, read your test results, renew a prescription or communicate with your care team.     To access your existing account, please contact your [facility] or call [phone] for  "assistance.        Care EveryWhere ID     This is your Care EveryWhere ID. This could be used by other organizations to access your [place] medical records  [id number]        Your Vitals Were     Pulse Height BMI (Body Mass Index)             88 1.689 m (5' 6.5\") 25.95 kg/m2          Blood Pressure from Last 3 Encounters:   08/23/18 125/85   08/14/18 131/84    Weight from Last 3 Encounters:   08/23/18 74 kg (163 lb 3.2 oz)   07/12/18 70.8 kg (156 lb)                 Today's Medication Changes          These changes are accurate as of 8/23/18 11:59 PM.  If you have any questions, ask your nurse or doctor.               Start taking these medicines.        Dose/Directions    levothyroxine 125 MCG tablet   Commonly known as:  SYNTHROID/LEVOTHROID   Used for:  Central hypothyroidism   Started by:  Ken Harvey MD        Dose:  125 mcg   Take 1 tablet (125 mcg) by mouth daily   Quantity:  90 tablet   Refills:  3            Where to get your medicines      These medications were sent to [facility] ([place]) - [place], [place] - [address] AT [facility] [address]  [address], [facility] [address]     Phone:  [phone]     levothyroxine 125 MCG tablet                Primary Care Provider    None Specified       No primary provider on file.        Equal Access to Services     MARTIN TOUSSAINT AH: Hadrosita miramontes Soaryan, waaxda luqadaha, qaybta kaalmashari dumont. So [facility] [phone].    ATENCIÓN: Si habla español, tiene a dangelo disposición servicios gratuitos de asistencia lingüística. Rhoda al [phone].    We comply with applicable federal civil rights laws and [place] laws. We do not discriminate on the basis of race, color, national origin, age, disability, sex, sexual orientation, or gender identity.            Thank you!     Thank you for choosing [facility]  for your care. Our goal is always to provide you " with excellent care. Hearing back from our patients is one way we can continue to improve our services. Please take a few minutes to complete the written survey that you may receive in the mail after your visit with us. Thank you!             Your Updated Medication List - Protect others around you: Learn how to safely use, store and throw away your medicines at www.disposemymeds.org.          This list is accurate as of 8/23/18 11:59 PM.  Always use your most recent med list.                   Brand Name Dispense Instructions for use Diagnosis    calcitonin (salmon) 200 UNIT/ACT nasal spray    MIACALCIN    1 Bottle    Spray 1 spray into one nostril alternating nostrils daily Alternate nostril each day.    Closed compression fracture of thoracic vertebra, initial encounter (H)       [facility] FISH OIL PO           FLAXSEED (LINSEED) PO           FLUoxetine 20 MG capsule    PROzac          levothyroxine 125 MCG tablet    SYNTHROID/LEVOTHROID    90 tablet    Take 1 tablet (125 mcg) by mouth daily    Central hypothyroidism       VITAMIN B COMPLEX PO           VITAMIN C PO           VITAMIN D (CHOLECALCIFEROL) PO      Take 1,000 Units by mouth daily

## 2018-08-23 NOTE — PROGRESS NOTES
"ENDOCRINE CLINIC NOTE      Chief complaint:  Lucia is a 53 year old female seen in consultation at the request of Dr. Conrad Shipley.       HISTORY OF PRESENT ILLNESS        REVIEW OF SYSTEMS  10 point negative except as mentioned in HPI    Past Medical/Surgical History:  Past Medical History:   Diagnosis Date     Compression fracture of thoracic vertebra (H)     2013 t10 and 2018 t7 and t8     Heartburn      History reviewed. No pertinent surgical history.    Medications  Current Outpatient Prescriptions   Medication     Ascorbic Acid (VITAMIN C PO)     B Complex Vitamins (VITAMIN B COMPLEX PO)     calcitonin, salmon, (MIACALCIN) 200 UNIT/ACT nasal spray     FLAXSEED, LINSEED, PO     FLUoxetine (PROZAC) 20 MG capsule     Omega-3 Fatty Acids (CVS FISH OIL PO)     VITAMIN D, CHOLECALCIFEROL, PO     No current facility-administered medications for this visit.        Allergies  No Known Allergies      Family History  family history includes Osteoperosis in her mother and sister.    Social History  Social History   Substance Use Topics     Smoking status: Never Smoker     Smokeless tobacco: Never Used     Alcohol use Yes      Comment: 0-1 a week       Physical Exam  /85  Pulse 88  Ht 1.689 m (5' 6.5\")  Wt 74 kg (163 lb 3.2 oz)  BMI 25.95 kg/m2  Body mass index is 25.95 kg/(m^2).  GENERAL :  In no apparent distress  SKIN: Normal color, normal temperature, texture.  No hirsutism, alopecia or purple striae.     EYES: PERRL, EOMI, No scleral icterus,  No proptosis, conjunctival redness, stare, retraction  MOUTH: Moist, pink; pharynx clear  NECK: No visible masses. No palpable adenopathy, or masses. No carotid bruits. THYROID:  Normal, nontender, smooth / firm texture,  no nodules, no Bruit   RESP: Lungs clear to auscultation bilaterally  CARDIAC: Regular rate and rhythm, normal S1 S2, without murmurs, rubs or gallops  ABDOMEN: Normal bowel sounds; soft, nontender, no HSM or masses       NEURO: awake, alert, " responds appropriately to questions.  Cranial nerves intact.  Moves all extremities; Gait normal.  No tremor of the outstretched hand.  DTRs  *** /4 ,   EXTREMITIES: No clubbing, cyanosis or edema.    DATA REVIEW  Labs/Imaging  ***  TSH   Date Value Ref Range Status   07/12/2018 0.04 (L) 0.40 - 4.00 mU/L Final     T4 Free   Date Value Ref Range Status   07/12/2018 0.30 (L) 0.76 - 1.46 ng/dL Final   ]  No results found for: A1C          ASSESSMENT/PLAN:   There are no Patient Instructions on file for this visit.  No orders of the defined types were placed in this encounter.           Patient seen and examined with staff endocrinologist  ***.     Ken Harvey MD  Diabetes, Metabolism and Endocrinology Fellow  Pager: [phone]

## 2018-08-24 LAB
IGF-I BLD-MCNC: 89 NG/ML (ref 49–234)
TTG IGA SER-ACNC: <1 U/ML
TTG IGG SER-ACNC: <1 U/ML

## 2018-08-24 RX ORDER — LEVOTHYROXINE SODIUM 125 UG/1
125 TABLET ORAL DAILY
Qty: 90 TABLET | Refills: 3 | Status: SHIPPED | OUTPATIENT
Start: 2018-08-24 | End: 2018-10-19
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
## 2018-08-30 NOTE — MR AVS SNAPSHOT
After Visit Summary   8/14/2018    Lucia Becerra    MRN: [id number]           Patient Information     Date Of Birth          1965        Visit Information        Provider Department      8/14/2018 8:30 AM [facility] ATC; UC SPEC INF[facility] Specialty and Procedure        Today's Diagnoses     Central hypothyroidism    -  1       Follow-ups after your visit        Your next 10 appointments already scheduled     Aug 23, 2018 10:40 AM CDT   (Arrive by 10:25 AM)   NEW ENDOCRINE with Ken Harvey MD   [facility] ([facility])    [address] [place] [address] [phone]              Who to contact     If you have questions or need follow up information about today's clinic visit or your schedule please contact [facility] SPECIALTY AND PROCEDURE directly at [phone].  Normal or non-critical lab and imaging results will be communicated to you by [employer], letter or phone within 4 business days after the clinic has received the results. If you do not hear from us within 7 days, please contact the clinic through [employer] or phone. If you have a critical or abnormal lab result, we will notify you by phone as soon as possible.  Submit refill requests through [employer] or call your pharmacy and they will forward the refill request to us. Please allow 3 business days for your refill to be completed.          Additional Information About Your Visit        MyChart Information     [employer] gives you secure access to your electronic health record. If you see a primary care provider, you can also send messages to your care team and make appointments. If you have questions, please call your primary care clinic.  If you do not have a primary care provider, please call [phone] and they will assist you.        Care EveryWhere ID     This is your Care EveryWhere ID. This could be used by other   this is the T3.  I declined refill and 2 patient is seen in the clinic.   organizations to access your [place] medical records  [id number]        Your Vitals Were     Pulse Temperature                84 97.4  F (36.3  C) (Oral)           Blood Pressure from Last 3 Encounters:   08/14/18 131/84    Weight from Last 3 Encounters:   07/12/18 70.8 kg (156 lb)              We Performed the Following     Adrenal corticotropin     Cosyntropin stimulation study baseline     Cosyntropin stimulation study post 30     Cosyntropin stimulation study post 60        Primary Care Provider    None Specified       No primary provider on file.        Equal Access to Services     MARTIN TOUSSAINT : Hadii breonna ku hadasho Soomaali, waaxda luqadaha, qaybta kaalmada adeegyada, waxay nubia de la paz . So [facility] [place] [facility] [phone].    ATENCIÓN: Si habla español, tiene a dangelo disposición servicios gratuitos de asistencia lingüística. Llame al [phone].    We comply with applicable federal civil rights laws and [place] laws. We do not discriminate on the basis of race, color, national origin, age, disability, sex, sexual orientation, or gender identity.            Thank you!     Thank you for choosing [facility] SPECIALTY AND PROCEDURE  for your care. Our goal is always to provide you with excellent care. Hearing back from our patients is one way we can continue to improve our services. Please take a few minutes to complete the written survey that you may receive in the mail after your visit with us. Thank you!             Your Updated Medication List - Protect others around you: Learn how to safely use, store and throw away your medicines at www.disposemymeds.org.          This list is accurate as of 8/14/18 10:09 AM.  Always use your most recent med list.                   Brand Name Dispense Instructions for use Diagnosis    calcitonin (salmon) 200 UNIT/ACT nasal spray    MIACALCIN    1 Bottle    Spray 1 spray into one nostril alternating nostrils daily Alternate nostril each  day.    Closed compression fracture of thoracic vertebra, initial encounter (H)       CVS FISH OIL PO           FLAXSEED (LINSEED) PO           FLUoxetine 20 MG capsule    PROzac          VITAMIN B COMPLEX PO           VITAMIN C PO           VITAMIN D (CHOLECALCIFEROL) PO      Take 1,000 Units by mouth daily

## 2018-10-18 ENCOUNTER — OFFICE VISIT (OUTPATIENT)
Dept: ENDOCRINOLOGY | Facility: CLINIC | Age: 53
End: 2018-10-18
Payer: COMMERCIAL

## 2018-10-18 VITALS
BODY MASS INDEX: 25.88 KG/M2 | SYSTOLIC BLOOD PRESSURE: 130 MMHG | DIASTOLIC BLOOD PRESSURE: 85 MMHG | HEIGHT: 67 IN | HEART RATE: 96 BPM | WEIGHT: 164.9 LBS

## 2018-10-18 DIAGNOSIS — E03.8 CENTRAL HYPOTHYROIDISM: ICD-10-CM

## 2018-10-18 DIAGNOSIS — E03.8 CENTRAL HYPOTHYROIDISM: Primary | ICD-10-CM

## 2018-10-18 LAB
T3 SERPL-MCNC: 101 NG/DL (ref 60–181)
T4 FREE SERPL-MCNC: 0.93 NG/DL (ref 0.76–1.46)

## 2018-10-18 ASSESSMENT — PAIN SCALES - GENERAL: PAINLEVEL: NO PAIN (0)

## 2018-10-18 NOTE — PATIENT INSTRUCTIONS
Labs today, we will adjust dose base on level today. I will contact you via [employer].  Labs in 3 months.  Return to clinic in 6 months
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
## 2018-10-19 RX ORDER — LEVOTHYROXINE SODIUM 125 UG/1
125 TABLET ORAL DAILY
Qty: 90 TABLET | Refills: 5 | Status: SHIPPED | OUTPATIENT
Start: 2018-10-19 | End: 2022-05-11

## 2019-01-07 ENCOUNTER — MYC MEDICAL ADVICE (OUTPATIENT)
Dept: ENDOCRINOLOGY | Facility: CLINIC | Age: 54
End: 2019-01-07

## 2019-01-09 NOTE — TELEPHONE ENCOUNTER
I am sorry to hear that. I agree that we should recheck labs.  Thyroid function test (TSH, FreeT4 and Total T3) is ordered, you can stop by any [facility] labs for blood draw.     I will contact you with the result.    Ken Harvey MD  Endocrine Fellow

## 2019-04-18 ENCOUNTER — OFFICE VISIT (OUTPATIENT)
Dept: ORTHOPEDICS | Facility: CLINIC | Age: 54
End: 2019-04-18
Payer: COMMERCIAL

## 2019-04-18 VITALS — BODY MASS INDEX: 26.06 KG/M2 | HEIGHT: 67 IN | WEIGHT: 166 LBS | RESPIRATION RATE: 16 BRPM

## 2019-04-18 DIAGNOSIS — M81.0 OSTEOPOROSIS, UNSPECIFIED OSTEOPOROSIS TYPE, UNSPECIFIED PATHOLOGICAL FRACTURE PRESENCE: Primary | ICD-10-CM

## 2019-04-18 DIAGNOSIS — E03.8 CENTRAL HYPOTHYROIDISM: ICD-10-CM

## 2019-04-18 DIAGNOSIS — S22.000S CLOSED COMPRESSION FRACTURE OF THORACIC VERTEBRA, SEQUELA: ICD-10-CM

## 2019-04-18 DIAGNOSIS — Z87.11 HX OF GASTRIC ULCER: ICD-10-CM

## 2019-04-18 RX ORDER — OMEPRAZOLE 40 MG/1
40 CAPSULE, DELAYED RELEASE ORAL DAILY
COMMUNITY
Start: 2019-04-18

## 2019-04-18 RX ORDER — ZOLEDRONIC ACID 5 MG/100ML
5 INJECTION, SOLUTION INTRAVENOUS ONCE
Status: CANCELLED
Start: 2019-04-18

## 2019-04-18 ASSESSMENT — MIFFLIN-ST. JEOR: SCORE: 1380.6

## 2019-04-18 NOTE — PROGRESS NOTES
Attending Note:   I have personally examined this patient and have reviewed the clinical presentation and progress note with the fellow. I agree with the treatment plan as outlined. The plan was formulated with the fellow on the day of the patient's visit. I have reviewed all imaging with the fellow and agree with the findings in the documentation.     Bea Riddle MD, CAQ, CCD  [facility]  Sports Medicine and Bone Health

## 2019-04-18 NOTE — PROGRESS NOTES
SUBJECTIVE:    Lucia Becerra is a 52 year old female with central hypothyroidism and osteoporosis here for f/u of osteoporosis.    She is sore and painful with her muscles of almost any activity. She hasn't fully recovered from it. Overall tiredness is way better.     She has significant heartburn, back on omeprazole 40mg daily and zantac at night. Without this heartburn daily. She is due for scope again May for hiatal hernia    Meds: takes prozac 20 daily, levothyroxine 125mcg daily, omeprazole 40mg daily     Review of systems  6 point review of systems is other wise negative (except for noted in HPI)    Past med hx:   2013: T10 compression fracture   2018: current fracture: t7 and t8 endplate plus t8 and t9 transverse processes and pedicle fractures (5/14/2018)      Family Hx   Maternal grandmother some sort of pituitary disease, had brain surgery and pain.   Mom eating disorder  Sister also has osteoporosis and eating disorder        Objective     Past Medical History:   Diagnosis Date     Compression fracture of thoracic vertebra (H)     2013 t10 and 2018 t7 and t8     Heartburn        Past Surgical History:   Procedure Laterality Date     DILATION AND CURETTAGE      For perimenopausal bleeding     TONSILLECTOMY         Current Outpatient Medications   Medication Sig Dispense Refill     Ascorbic Acid (VITAMIN C PO)        B Complex Vitamins (VITAMIN B COMPLEX PO)        calcitonin, salmon, (MIACALCIN) 200 UNIT/ACT nasal spray Spray 1 spray into one nostril alternating nostrils daily Alternate nostril each day. 1 Bottle 1     FLAXSEED, LINSEED, PO        FLUoxetine (PROZAC) 20 MG capsule        levothyroxine (SYNTHROID/LEVOTHROID) 125 MCG tablet Take 1 tablet (125 mcg) by mouth daily 90 tablet 5     Omega-3 Fatty Acids ([employer] FISH OIL PO)        VITAMIN D, CHOLECALCIFEROL, PO Take 1,000 Units by mouth daily         Family History   Problem Relation Age of Onset     Osteoporosis Mother      Osteoporosis Sister   "    Pituitary Disorder Maternal Grandfather         Acromegaly     Other - See Comments Other         Factor V Leiden     Hypothyroidism Other         Aunt       Social History     Socioeconomic History     Marital status:      Spouse name: Not on file     Number of children: Not on file     Years of education: Not on file     Highest education level: Not on file   Occupational History     Not on file   Social Needs     Financial resource strain: Not on file     Food insecurity:     Worry: Not on file     Inability: Not on file     Transportation needs:     Medical: Not on file     Non-medical: Not on file   Tobacco Use     Smoking status: Never Smoker     Smokeless tobacco: Never Used   Substance and Sexual Activity     Alcohol use: Yes     Comment: 0-1 a week     Drug use: No     Sexual activity: Not on file   Lifestyle     Physical activity:     Days per week: Not on file     Minutes per session: Not on file     Stress: Not on file   Relationships     Social connections:     Talks on phone: Not on file     Gets together: Not on file     Attends Rastafari service: Not on file     Active member of club or organization: Not on file     Attends meetings of clubs or organizations: Not on file     Relationship status: Not on file     Intimate partner violence:     Fear of current or ex partner: Not on file     Emotionally abused: Not on file     Physically abused: Not on file     Forced sexual activity: Not on file   Other Topics Concern     Not on file   Social History Narrative     Not on file       OBJECTIVE:  Resp 16   Ht 1.702 m (5' 7\")   Wt 75.3 kg (166 lb)   BMI 26.00 kg/m     Gen: normal appearance, in no obvious distress  Problem: Normal respiratory pattern  Skin: no ecchymosis, erythema, warmth, or induration, no obvious rash  Psych: interactive, appropriate, normal mood and affect           Imaging from outside documents:   CT Spine: 5/2018:   1. Mild compression type fracture deformities at each " level from T6 through t10.   2. no superimposed acute fractures (although difficult on CT)  3. T2-3 disc degeneration with mild dorsal bulging    MRI: 5/23/18  1. mild superior endplate fracture at T7-8 with mild marrow edema suggesting recent fractures  2. chronic mild superior at T2,T6,T9,T10  3. marrow edema at left t8 and T9 fransverse fractures and pedicles     ASSESSMENT:  Osteoporosis (osteopenia + insufficiency fractures)  Central isolated hypothyroidism    PLAN:  Discussed that she is doing well from a thyroid perspective. will continue follow up with the endocrine team for this. Given her insufficiency fractures I have recommended treatment for osteoporosis. She has a hx of severe heartburn noted on EGD and thus I have recommended against initial therapy with an oral bisphosphonate.     After discussion with the patient she elects for reclast once yearly infusion. I recommended a repeat DXA one year from now (from treatment) and follow up after that has been done.       Patient Instructions   1. Get infusion- information for [place] infusion given    2. Keep on the vitamin d and calcium    3. Try the PT with kinetic if not covered call back and we can do it to [facility] or call [facility] to get in.    4. Come back in 1 year for recheck- do a dxa before that       Patient seen and discussed with Dr. Bea Riddle.       Conrad Shipley   Sports Medicine Fellow

## 2019-04-18 NOTE — LETTER
4/18/2019      RE: Lucia Becerra  [address]  [facility] [address]       SUBJECTIVE:    Lucia Becerra is a 52 year old female with central hypothyroidism and osteoporosis here for f/u of osteoporosis.    She is sore and painful with her muscles of almost any activity. She hasn't fully recovered from it. Overall tiredness is way better.     She has significant heartburn, back on omeprazole 40mg daily and zantac at night. Without this heartburn daily. She is due for scope again May for hiatal hernia    Meds: takes prozac 20 daily, levothyroxine 125mcg daily, omeprazole 40mg daily     Review of systems  6 point review of systems is other wise negative (except for noted in HPI)    Past med hx:   2013: T10 compression fracture   2018: current fracture: t7 and t8 endplate plus t8 and t9 transverse processes and pedicle fractures (5/14/2018)      Family Hx   Maternal grandmother some sort of pituitary disease, had brain surgery and pain.   Mom eating disorder  Sister also has osteoporosis and eating disorder        Objective     Past Medical History:   Diagnosis Date     Compression fracture of thoracic vertebra (H)     2013 t10 and 2018 t7 and t8     Heartburn        Past Surgical History:   Procedure Laterality Date     DILATION AND CURETTAGE      For perimenopausal bleeding     TONSILLECTOMY         Current Outpatient Medications   Medication Sig Dispense Refill     Ascorbic Acid (VITAMIN C PO)        B Complex Vitamins (VITAMIN B COMPLEX PO)        calcitonin, salmon, (MIACALCIN) 200 UNIT/ACT nasal spray Spray 1 spray into one nostril alternating nostrils daily Alternate nostril each day. 1 Bottle 1     FLAXSEED, LINSEED, PO        FLUoxetine (PROZAC) 20 MG capsule        levothyroxine (SYNTHROID/LEVOTHROID) 125 MCG tablet Take 1 tablet (125 mcg) by mouth daily 90 tablet 5     Omega-3 Fatty Acids ([employer] FISH OIL PO)        VITAMIN D, CHOLECALCIFEROL, PO Take 1,000 Units by mouth daily         Family History  "  Problem Relation Age of Onset     Osteoporosis Mother      Osteoporosis Sister      Pituitary Disorder Maternal Grandfather         Acromegaly     Other - See Comments Other         Factor V Leiden     Hypothyroidism Other         Aunt       Social History     Socioeconomic History     Marital status:      Spouse name: Not on file     Number of children: Not on file     Years of education: Not on file     Highest education level: Not on file   Occupational History     Not on file   Social Needs     Financial resource strain: Not on file     Food insecurity:     Worry: Not on file     Inability: Not on file     Transportation needs:     Medical: Not on file     Non-medical: Not on file   Tobacco Use     Smoking status: Never Smoker     Smokeless tobacco: Never Used   Substance and Sexual Activity     Alcohol use: Yes     Comment: 0-1 a week     Drug use: No     Sexual activity: Not on file   Lifestyle     Physical activity:     Days per week: Not on file     Minutes per session: Not on file     Stress: Not on file   Relationships     Social connections:     Talks on phone: Not on file     Gets together: Not on file     Attends Buddhism service: Not on file     Active member of club or organization: Not on file     Attends meetings of clubs or organizations: Not on file     Relationship status: Not on file     Intimate partner violence:     Fear of current or ex partner: Not on file     Emotionally abused: Not on file     Physically abused: Not on file     Forced sexual activity: Not on file   Other Topics Concern     Not on file   Social History Narrative     Not on file       OBJECTIVE:  Resp 16   Ht 1.702 m (5' 7\")   Wt 75.3 kg (166 lb)   BMI 26.00 kg/m      Gen: normal appearance, in no obvious distress  Problem: Normal respiratory pattern  Skin: no ecchymosis, erythema, warmth, or induration, no obvious rash  Psych: interactive, appropriate, normal mood and affect           Imaging from outside " documents:   CT Spine: 5/2018:   1. Mild compression type fracture deformities at each level from T6 through t10.   2. no superimposed acute fractures (although difficult on CT)  3. T2-3 disc degeneration with mild dorsal bulging    MRI: 5/23/18  1. mild superior endplate fracture at T7-8 with mild marrow edema suggesting recent fractures  2. chronic mild superior at T2,T6,T9,T10  3. marrow edema at left t8 and T9 fransverse fractures and pedicles     ASSESSMENT:  Osteoporosis (osteopenia + insufficiency fractures)  Central isolated hypothyroidism    PLAN:  Discussed that she is doing well from a thyroid perspective. will continue follow up with the endocrine team for this. Given her insufficiency fractures I have recommended treatment for osteoporosis. She has a hx of severe heartburn noted on EGD and thus I have recommended against initial therapy with an oral bisphosphonate.     After discussion with the patient she elects for reclast once yearly infusion. I recommended a repeat DXA one year from now (from treatment) and follow up after that has been done.       Patient Instructions   1. Get infusion- information for [place] infusion given    2. Keep on the vitamin d and calcium    3. Try the PT with kinetic if not covered call back and we can do it to [facility] or call [facility]a to get in.    4. Come back in 1 year for recheck- do a dxa before that       Patient seen and discussed with Dr. Bea Riddle.       Conrad Shipley   Sports Medicine Fellow    Attending Note:   I have personally examined this patient and have reviewed the clinical presentation and progress note with the fellow. I agree with the treatment plan as outlined. The plan was formulated with the fellow on the day of the patient's visit. I have reviewed all imaging with the fellow and agree with the findings in the documentation.     Bea Riddle MD, CAQ, CCD  [facility]  Sports Medicine and Bone Health    Conrad Shipley MD

## 2019-04-18 NOTE — PATIENT INSTRUCTIONS
[facility]  [address], [place] [address]  Phone: [phone] / Fax: [phone]  Mon-Fri: 7:30 a.m. - 4:30 p.m.   Sat & Sun, 8 a.m.- 12 p.m. (appointments must be made by 4 p.m. on Friday)    OR   [facility]  [facility]  [address] [place], [place] [id number]  Phone: [phone]  Pharmacy: [phone] / Fax: [phone]  Mon-Fri: 7 a.m. - 7 p.m.  Sat.-Sun. 7 a.m. - 2 p.m.    2. Keep on the vitamin d and calcium    3. Try the PT with kinetic if not covered call back and we can do it to [facility] or call [facility]a to get in.    4. Come back in 1 year for recheck- do a dxa before that

## 2019-05-14 RX ORDER — ZOLEDRONIC ACID 5 MG/100ML
5 INJECTION, SOLUTION INTRAVENOUS ONCE
Status: CANCELLED
Start: 2019-05-14

## 2019-05-15 ENCOUNTER — APPOINTMENT (OUTPATIENT)
Dept: LAB | Facility: CLINIC | Age: 54
End: 2019-05-15
Attending: FAMILY MEDICINE
Payer: COMMERCIAL

## 2019-05-15 ENCOUNTER — INFUSION THERAPY VISIT (OUTPATIENT)
Dept: INFUSION THERAPY | Facility: CLINIC | Age: 54
End: 2019-05-15
Attending: FAMILY MEDICINE
Payer: COMMERCIAL

## 2019-05-15 VITALS
TEMPERATURE: 97.9 F | RESPIRATION RATE: 18 BRPM | HEART RATE: 75 BPM | OXYGEN SATURATION: 98 % | DIASTOLIC BLOOD PRESSURE: 81 MMHG | WEIGHT: 173 LBS | BODY MASS INDEX: 27.1 KG/M2 | SYSTOLIC BLOOD PRESSURE: 114 MMHG

## 2019-05-15 DIAGNOSIS — E03.8 CENTRAL HYPOTHYROIDISM: Primary | ICD-10-CM

## 2019-05-15 DIAGNOSIS — M81.0 OSTEOPOROSIS, UNSPECIFIED OSTEOPOROSIS TYPE, UNSPECIFIED PATHOLOGICAL FRACTURE PRESENCE: ICD-10-CM

## 2019-05-15 LAB
CALCIUM SERPL-MCNC: 9.3 MG/DL (ref 8.5–10.1)
CREAT SERPL-MCNC: 0.7 MG/DL (ref 0.52–1.04)
GFR SERPL CREATININE-BSD FRML MDRD: >90 ML/MIN/{1.73_M2}

## 2019-05-15 PROCEDURE — 82565 ASSAY OF CREATININE: CPT | Performed by: FAMILY MEDICINE

## 2019-05-15 PROCEDURE — 25000128 H RX IP 250 OP 636: Mod: ZF | Performed by: FAMILY MEDICINE

## 2019-05-15 PROCEDURE — 96365 THER/PROPH/DIAG IV INF INIT: CPT

## 2019-05-15 PROCEDURE — 82310 ASSAY OF CALCIUM: CPT | Performed by: FAMILY MEDICINE

## 2019-05-15 RX ORDER — ZOLEDRONIC ACID 5 MG/100ML
5 INJECTION, SOLUTION INTRAVENOUS ONCE
Status: COMPLETED | OUTPATIENT
Start: 2019-05-15 | End: 2019-05-15

## 2019-05-15 RX ORDER — HEPARIN SODIUM (PORCINE) LOCK FLUSH IV SOLN 100 UNIT/ML 100 UNIT/ML
5 SOLUTION INTRAVENOUS EVERY 8 HOURS
Status: DISCONTINUED | OUTPATIENT
Start: 2019-05-15 | End: 2019-05-15 | Stop reason: HOSPADM

## 2019-05-15 RX ADMIN — ZOLEDRONIC ACID 5 MG: 0.05 INJECTION, SOLUTION INTRAVENOUS at 11:55

## 2019-05-15 ASSESSMENT — PAIN SCALES - GENERAL: PAINLEVEL: NO PAIN (0)

## 2019-05-15 NOTE — PROGRESS NOTES
Nursing Note  Lucia Becerra presents today to [facility] for:   Chief Complaint   Patient presents with     Blood Draw     Labs drawn via PIV placed by RN in lab. Line flushed with saline. VS taken .     Infusion     reclast     During today's [facility] appointment, orders from Dr. Conrad Shipley were completed.  Frequency: once    Progress note:  Patient identification verified by name and date of birth.  Assessment completed.  Vitals recorded in Doc Flowsheets.  Patient was provided with education regarding infusion and possible side effects.  Patient verbalized understanding.     present during visit today: Not Applicable.    Pre Infusion Conditions: non-applicable.    Premedications: were not ordered.    Drug Waste Record:    Infusion length and rate:  infusion given over approximately 15 minutes.    Labs: were drawn prior to appointment on 5/15 in [facility] lab.    Vascular access: peripheral IV placed today in [facility] lab    Creatinine clearance 112.5 today.     Post Infusion Assessment:  Patient tolerated infusion without incident.  Blood return noted pre and post infusion.  Site patent and intact, free from redness, edema or discomfort.  No evidence of extravasations.     Discharge Plan:   Follow up plan of care with: ongoing infusions at [facility].  Discharge instructions were reviewed with patient.  Patient/representative verbalized understanding of discharge instructions and all questions answered.  Patient discharged from [facility] in stable condition.    Aubrie Hoang RN    Administrations This Visit     sodium chloride (PF) 0.9% PF flush 20 mL     Admin Date  05/15/2019 Action  Given Dose  10 mL Route  Intracatheter Administered By  Cherie Daigle RN                /76 (BP Location: Right arm, Patient Position: Sitting, Cuff Size: Adult Regular)   Pulse 82    Temp 97.9  F (36.6  C) (Oral)   Resp 18   Wt 78.5 kg (173 lb)   SpO2 98%   BMI 27.10 kg/m

## 2019-05-15 NOTE — PATIENT INSTRUCTIONS
Dear Lucia Becerra    Thank you for choosing [facility] ([facility]) for your infusion.  The following information is a summary of our appointment as well as important reminders.      Your received your reclast infusion today.     We look forward in seeing you on your next appointment here at [facility] ([facility]).  Please don t hesitate to call us at [phone] to reschedule any of your appointments or to speak with one of the [facility] registered nurses.  It was a pleasure taking care of you today.    Sincerely,    [facility]  [facility]  [address] [place] [address]  [id number]  Phone:  [phone]    Patient Education     Patient Education    Zoledronic Acid Solution for injection    Zoledronic Acid Solution for injection [Hypercalcemia of Malignancy]    Zoledronic Acid Solution for injection [Pagets Disease]  Zoledronic Acid Solution for injection [Pagets Disease]  What is this medicine?  ZOLEDRONIC ACID (AVIS le dana ik AS id) lowers the amount of calcium loss from bone. It is used to treat Paget's disease and osteoporosis in women.  This medicine may be used for other purposes; ask your health care provider or pharmacist if you have questions.  What should I tell my health care provider before I take this medicine?  They need to know if you have any of these conditions:    aspirin-sensitive asthma    cancer, especially if you are receiving medicines used to treat cancer    dental disease or wear dentures    infection    kidney disease    low levels of calcium in the blood    past surgery on the parathyroid gland or intestines    receiving corticosteroids like dexamethasone or prednisone    an unusual or allergic reaction to zoledronic acid, other medicines, foods, dyes, or preservatives    pregnant or trying to get pregnant    breast-feeding  How should I use this  medicine?  This medicine is for infusion into a vein. It is given by a health care professional in a hospital or clinic setting.  Talk to your pediatrician regarding the use of this medicine in children. This medicine is not approved for use in children.  Overdosage: If you think you have taken too much of this medicine contact a poison control center or emergency room at once.  NOTE: This medicine is only for you. Do not share this medicine with others.  What if I miss a dose?  It is important not to miss your dose. Call your doctor or health care professional if you are unable to keep an appointment.  What may interact with this medicine?    certain antibiotics given by injection    NSAIDs, medicines for pain and inflammation, like ibuprofen or naproxen    some diuretics like bumetanide, furosemide    teriparatide  This list may not describe all possible interactions. Give your health care provider a list of all the medicines, herbs, non-prescription drugs, or dietary supplements you use. Also tell them if you smoke, drink alcohol, or use illegal drugs. Some items may interact with your medicine.  What should I watch for while using this medicine?  Visit your doctor or health care professional for regular checkups. It may be some time before you see the benefit from this medicine. Do not stop taking your medicine unless your doctor tells you to. Your doctor may order blood tests or other tests to see how you are doing.  Women should inform their doctor if they wish to become pregnant or think they might be pregnant. There is a potential for serious side effects to an unborn child. Talk to your health care professional or pharmacist for more information.  You should make sure that you get enough calcium and vitamin D while you are taking this medicine. Discuss the foods you eat and the vitamins you take with your health care professional.  Some people who take this medicine have severe bone, joint, and/or muscle  pain. This medicine may also increase your risk for jaw problems or a broken thigh bone. Tell your doctor right away if you have severe pain in your jaw, bones, joints, or muscles. Tell your doctor if you have any pain that does not go away or that gets worse.  Tell your dentist and dental surgeon that you are taking this medicine. You should not have major dental surgery while on this medicine. See your dentist to have a dental exam and fix any dental problems before starting this medicine. Take good care of your teeth while on this medicine. Make sure you see your dentist for regular follow-up appointments.  What side effects may I notice from receiving this medicine?  Side effects that you should report to your doctor or health care professional as soon as possible:    allergic reactions like skin rash, itching or hives, swelling of the face, lips, or tongue    anxiety, confusion, or depression    breathing problems    changes in vision    eye pain    feeling faint or lightheaded, falls    jaw pain, especially after dental work    mouth sores    muscle cramps, stiffness, or weakness    redness, blistering, peeling or loosening of the skin, including inside the mouth    trouble passing urine or change in the amount of urine  Side effects that usually do not require medical attention (report to your doctor or health care professional if they continue or are bothersome):    bone, joint, or muscle pain    constipation    diarrhea    fever    hair loss    irritation at site where injected    loss of appetite    nausea, vomiting    stomach upset    trouble sleeping    trouble swallowing    weak or tired  This list may not describe all possible side effects. Call your doctor for medical advice about side effects. You may report side effects to FDA at 7-292-FDA-6643.  Where should I keep my medicine?  This drug is given in a hospital or clinic and will not be stored at home.  NOTE:This sheet is a summary. It may not  cover all possible information. If you have questions about this medicine, talk to your doctor, pharmacist, or health care provider. Copyright  2016 Gold Standard
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
## 2019-05-31 ENCOUNTER — OFFICE VISIT (OUTPATIENT)
Dept: ENDOCRINOLOGY | Facility: CLINIC | Age: 54
End: 2019-05-31
Payer: COMMERCIAL

## 2019-05-31 VITALS
SYSTOLIC BLOOD PRESSURE: 117 MMHG | BODY MASS INDEX: 27.04 KG/M2 | WEIGHT: 172.3 LBS | HEART RATE: 69 BPM | HEIGHT: 67 IN | DIASTOLIC BLOOD PRESSURE: 79 MMHG

## 2019-05-31 DIAGNOSIS — E03.8 CENTRAL HYPOTHYROIDISM: Primary | ICD-10-CM

## 2019-05-31 DIAGNOSIS — M81.0 OSTEOPOROSIS, UNSPECIFIED OSTEOPOROSIS TYPE, UNSPECIFIED PATHOLOGICAL FRACTURE PRESENCE: ICD-10-CM

## 2019-05-31 DIAGNOSIS — E03.8 CENTRAL HYPOTHYROIDISM: ICD-10-CM

## 2019-05-31 LAB
ACTH PLAS-MCNC: 16 PG/ML
DEPRECATED CALCIDIOL+CALCIFEROL SERPL-MC: 38 UG/L (ref 20–75)
ERYTHROCYTE [DISTWIDTH] IN BLOOD BY AUTOMATED COUNT: 12.4 % (ref 10–15)
ESTRADIOL SERPL-MCNC: 14 PG/ML
FSH SERPL-ACNC: 85.6 IU/L
HCT VFR BLD AUTO: 37.7 % (ref 35–47)
HGB BLD-MCNC: 12 G/DL (ref 11.7–15.7)
IGF-I BLD-MCNC: 53 NG/ML (ref 49–234)
LH SERPL-ACNC: 19.8 IU/L
MCH RBC QN AUTO: 28.5 PG (ref 26.5–33)
MCHC RBC AUTO-ENTMCNC: 31.8 G/DL (ref 31.5–36.5)
MCV RBC AUTO: 90 FL (ref 78–100)
PLATELET # BLD AUTO: 473 10E9/L (ref 150–450)
PROLACTIN SERPL-MCNC: 4 UG/L (ref 3–27)
RBC # BLD AUTO: 4.21 10E12/L (ref 3.8–5.2)
T3 SERPL-MCNC: 115 NG/DL (ref 60–181)
T4 FREE SERPL-MCNC: 0.71 NG/DL (ref 0.76–1.46)
TSH SERPL DL<=0.005 MIU/L-ACNC: 0.06 MU/L (ref 0.4–4)
WBC # BLD AUTO: 6.4 10E9/L (ref 4–11)

## 2019-05-31 ASSESSMENT — MIFFLIN-ST. JEOR: SCORE: 1409.18

## 2019-05-31 ASSESSMENT — PAIN SCALES - GENERAL: PAINLEVEL: NO PAIN (0)

## 2019-05-31 NOTE — PATIENT INSTRUCTIONS
- lab today  - will let you know with results through [employer]  - If you have any questions, please do not hesitate to call [phone] and ask for Endocrinology clinic.      After clinic hours, please contact [phone] and ask for Endocrinologist-on call    Sincerely,    Matty Pradhan MD  Endocrinology
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
## 2019-06-05 DIAGNOSIS — E03.8 CENTRAL HYPOTHYROIDISM: Primary | ICD-10-CM

## 2020-02-20 ENCOUNTER — AMBULATORY - HEALTHEAST (OUTPATIENT)
Dept: OTHER | Facility: CLINIC | Age: 55
End: 2020-02-20

## 2020-03-11 ENCOUNTER — HEALTH MAINTENANCE LETTER (OUTPATIENT)
Age: 55
End: 2020-03-11

## 2021-01-03 ENCOUNTER — HEALTH MAINTENANCE LETTER (OUTPATIENT)
Age: 56
End: 2021-01-03

## 2021-04-16 NOTE — NURSING NOTE
Chief Complaint   Patient presents with     Blood Draw     Labs drawn via PIV placed by RN in lab. Line flushed with saline. VS taken .     Cherie Daigle RN     done

## 2021-04-25 ENCOUNTER — HEALTH MAINTENANCE LETTER (OUTPATIENT)
Age: 56
End: 2021-04-25

## 2021-05-26 ENCOUNTER — RECORDS - HEALTHEAST (OUTPATIENT)
Dept: ADMINISTRATIVE | Facility: CLINIC | Age: 56
End: 2021-05-26

## 2021-05-31 ENCOUNTER — RECORDS - HEALTHEAST (OUTPATIENT)
Dept: ADMINISTRATIVE | Facility: CLINIC | Age: 56
End: 2021-05-31

## 2021-07-13 ENCOUNTER — RECORDS - HEALTHEAST (OUTPATIENT)
Dept: ADMINISTRATIVE | Facility: CLINIC | Age: 56
End: 2021-07-13

## 2021-07-21 ENCOUNTER — RECORDS - HEALTHEAST (OUTPATIENT)
Dept: ADMINISTRATIVE | Facility: CLINIC | Age: 56
End: 2021-07-21

## 2021-10-10 ENCOUNTER — HEALTH MAINTENANCE LETTER (OUTPATIENT)
Age: 56
End: 2021-10-10

## 2022-03-26 ENCOUNTER — HEALTH MAINTENANCE LETTER (OUTPATIENT)
Age: 57
End: 2022-03-26

## 2022-05-11 ENCOUNTER — OFFICE VISIT (OUTPATIENT)
Dept: INTERNAL MEDICINE | Facility: CLINIC | Age: 57
End: 2022-05-11
Payer: COMMERCIAL

## 2022-05-11 VITALS
BODY MASS INDEX: 20.69 KG/M2 | WEIGHT: 136.5 LBS | DIASTOLIC BLOOD PRESSURE: 62 MMHG | OXYGEN SATURATION: 98 % | SYSTOLIC BLOOD PRESSURE: 119 MMHG | HEART RATE: 95 BPM | HEIGHT: 68 IN

## 2022-05-11 DIAGNOSIS — M81.0 OSTEOPOROSIS, UNSPECIFIED OSTEOPOROSIS TYPE, UNSPECIFIED PATHOLOGICAL FRACTURE PRESENCE: ICD-10-CM

## 2022-05-11 DIAGNOSIS — Z13.1 SCREENING FOR DIABETES MELLITUS: ICD-10-CM

## 2022-05-11 DIAGNOSIS — E03.8 CENTRAL HYPOTHYROIDISM: ICD-10-CM

## 2022-05-11 DIAGNOSIS — S22.000S CLOSED COMPRESSION FRACTURE OF THORACIC VERTEBRA, SEQUELA: ICD-10-CM

## 2022-05-11 DIAGNOSIS — Z00.00 ROUTINE GENERAL MEDICAL EXAMINATION AT A HEALTH CARE FACILITY: Primary | ICD-10-CM

## 2022-05-11 PROCEDURE — 99386 PREV VISIT NEW AGE 40-64: CPT | Performed by: INTERNAL MEDICINE

## 2022-05-11 RX ORDER — TRAZODONE HYDROCHLORIDE 50 MG/1
TABLET, FILM COATED ORAL
COMMUNITY
Start: 2022-03-08 | End: 2022-06-07

## 2022-05-11 RX ORDER — LEVOTHYROXINE SODIUM 137 UG/1
TABLET ORAL
COMMUNITY
Start: 2022-04-25 | End: 2022-07-26
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
## 2022-05-12 ENCOUNTER — TRANSFERRED RECORDS (OUTPATIENT)
Dept: HEALTH INFORMATION MANAGEMENT | Facility: CLINIC | Age: 57
End: 2022-05-12
Payer: COMMERCIAL

## 2022-05-16 ENCOUNTER — LAB (OUTPATIENT)
Dept: LAB | Facility: CLINIC | Age: 57
End: 2022-05-16
Payer: COMMERCIAL

## 2022-05-16 DIAGNOSIS — Z00.00 ROUTINE GENERAL MEDICAL EXAMINATION AT A HEALTH CARE FACILITY: ICD-10-CM

## 2022-05-16 DIAGNOSIS — Z13.1 SCREENING FOR DIABETES MELLITUS: ICD-10-CM

## 2022-05-16 DIAGNOSIS — E03.8 CENTRAL HYPOTHYROIDISM: ICD-10-CM

## 2022-05-16 DIAGNOSIS — M81.0 OSTEOPOROSIS, UNSPECIFIED OSTEOPOROSIS TYPE, UNSPECIFIED PATHOLOGICAL FRACTURE PRESENCE: ICD-10-CM

## 2022-05-16 LAB
ALBUMIN SERPL-MCNC: 3.7 G/DL (ref 3.5–5)
ALP SERPL-CCNC: 54 U/L (ref 45–120)
ALT SERPL W P-5'-P-CCNC: 14 U/L (ref 0–45)
ANION GAP SERPL CALCULATED.3IONS-SCNC: 12 MMOL/L (ref 5–18)
AST SERPL W P-5'-P-CCNC: 18 U/L (ref 0–40)
BILIRUB SERPL-MCNC: 1 MG/DL (ref 0–1)
BUN SERPL-MCNC: 14 MG/DL (ref 8–22)
CALCIUM SERPL-MCNC: 8.8 MG/DL (ref 8.5–10.5)
CHLORIDE BLD-SCNC: 105 MMOL/L (ref 98–107)
CHOLEST SERPL-MCNC: 196 MG/DL
CO2 SERPL-SCNC: 24 MMOL/L (ref 22–31)
CREAT SERPL-MCNC: 0.68 MG/DL (ref 0.6–1.1)
DEPRECATED CALCIDIOL+CALCIFEROL SERPL-MC: 55 UG/L (ref 20–75)
ERYTHROCYTE [DISTWIDTH] IN BLOOD BY AUTOMATED COUNT: 12 % (ref 10–15)
FASTING STATUS PATIENT QL REPORTED: NORMAL
GFR SERPL CREATININE-BSD FRML MDRD: >90 ML/MIN/1.73M2
GLUCOSE BLD-MCNC: 88 MG/DL (ref 70–125)
HBA1C MFR BLD: 5.6 % (ref 0–5.6)
HCT VFR BLD AUTO: 36.8 % (ref 35–47)
HDLC SERPL-MCNC: 89 MG/DL
HGB BLD-MCNC: 12.2 G/DL (ref 11.7–15.7)
LDLC SERPL CALC-MCNC: 99 MG/DL
MCH RBC QN AUTO: 28.2 PG (ref 26.5–33)
MCHC RBC AUTO-ENTMCNC: 33.2 G/DL (ref 31.5–36.5)
MCV RBC AUTO: 85 FL (ref 78–100)
PLATELET # BLD AUTO: 287 10E3/UL (ref 150–450)
POTASSIUM BLD-SCNC: 4.1 MMOL/L (ref 3.5–5)
PROT SERPL-MCNC: 6.2 G/DL (ref 6–8)
PTH-INTACT SERPL-MCNC: 65 PG/ML (ref 10–86)
RBC # BLD AUTO: 4.33 10E6/UL (ref 3.8–5.2)
SODIUM SERPL-SCNC: 141 MMOL/L (ref 136–145)
T3 SERPL-MCNC: 90 NG/DL (ref 60–181)
T4 FREE SERPL-MCNC: 0.8 NG/DL (ref 0.7–1.8)
TRIGL SERPL-MCNC: 38 MG/DL
TSH SERPL DL<=0.005 MIU/L-ACNC: <0.01 UIU/ML (ref 0.3–5)
WBC # BLD AUTO: 3.6 10E3/UL (ref 4–11)

## 2022-05-16 PROCEDURE — 82306 VITAMIN D 25 HYDROXY: CPT

## 2022-05-16 PROCEDURE — 36415 COLL VENOUS BLD VENIPUNCTURE: CPT

## 2022-05-16 PROCEDURE — 80053 COMPREHEN METABOLIC PANEL: CPT

## 2022-05-16 PROCEDURE — 84443 ASSAY THYROID STIM HORMONE: CPT

## 2022-05-16 PROCEDURE — 84439 ASSAY OF FREE THYROXINE: CPT

## 2022-05-16 PROCEDURE — 83036 HEMOGLOBIN GLYCOSYLATED A1C: CPT

## 2022-05-16 PROCEDURE — 83970 ASSAY OF PARATHORMONE: CPT

## 2022-05-16 PROCEDURE — 80061 LIPID PANEL: CPT

## 2022-05-16 PROCEDURE — 85027 COMPLETE CBC AUTOMATED: CPT

## 2022-05-16 PROCEDURE — 84480 ASSAY TRIIODOTHYRONINE (T3): CPT

## 2022-06-04 ENCOUNTER — MYC MEDICAL ADVICE (OUTPATIENT)
Dept: INTERNAL MEDICINE | Facility: CLINIC | Age: 57
End: 2022-06-04
Payer: COMMERCIAL

## 2022-06-04 DIAGNOSIS — F51.01 PRIMARY INSOMNIA: Primary | ICD-10-CM

## 2022-06-06 ENCOUNTER — TRANSFERRED RECORDS (OUTPATIENT)
Dept: HEALTH INFORMATION MANAGEMENT | Facility: CLINIC | Age: 57
End: 2022-06-06
Payer: COMMERCIAL

## 2022-06-07 RX ORDER — TRAZODONE HYDROCHLORIDE 50 MG/1
50 TABLET, FILM COATED ORAL AT BEDTIME
Qty: 90 TABLET | Refills: 3 | Status: SHIPPED | OUTPATIENT
Start: 2022-06-07 | End: 2023-05-10

## 2022-06-27 ENCOUNTER — ANCILLARY PROCEDURE (OUTPATIENT)
Dept: BONE DENSITY | Facility: CLINIC | Age: 57
End: 2022-06-27
Attending: INTERNAL MEDICINE
Payer: COMMERCIAL

## 2022-06-27 DIAGNOSIS — M81.0 OSTEOPOROSIS, UNSPECIFIED OSTEOPOROSIS TYPE, UNSPECIFIED PATHOLOGICAL FRACTURE PRESENCE: ICD-10-CM

## 2022-06-27 PROCEDURE — 77080 DXA BONE DENSITY AXIAL: CPT | Performed by: INTERNAL MEDICINE

## 2022-07-12 ENCOUNTER — OFFICE VISIT (OUTPATIENT)
Dept: INTERNAL MEDICINE | Facility: CLINIC | Age: 57
End: 2022-07-12
Payer: COMMERCIAL

## 2022-07-12 VITALS
TEMPERATURE: 98.5 F | DIASTOLIC BLOOD PRESSURE: 74 MMHG | BODY MASS INDEX: 20.31 KG/M2 | SYSTOLIC BLOOD PRESSURE: 120 MMHG | HEART RATE: 84 BPM | HEIGHT: 68 IN | OXYGEN SATURATION: 100 % | WEIGHT: 134 LBS

## 2022-07-12 DIAGNOSIS — Z91.89 RISK OF EXPOSURE TO LYME DISEASE: Primary | ICD-10-CM

## 2022-07-12 DIAGNOSIS — A69.20 LYME DISEASE: ICD-10-CM

## 2022-07-12 PROCEDURE — 86617 LYME DISEASE ANTIBODY: CPT | Performed by: INTERNAL MEDICINE

## 2022-07-12 PROCEDURE — 86618 LYME DISEASE ANTIBODY: CPT | Performed by: INTERNAL MEDICINE

## 2022-07-12 PROCEDURE — 99213 OFFICE O/P EST LOW 20 MIN: CPT | Performed by: INTERNAL MEDICINE

## 2022-07-12 PROCEDURE — 36415 COLL VENOUS BLD VENIPUNCTURE: CPT | Performed by: INTERNAL MEDICINE
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
## 2022-07-13 ENCOUNTER — MYC MEDICAL ADVICE (OUTPATIENT)
Dept: INTERNAL MEDICINE | Facility: CLINIC | Age: 57
End: 2022-07-13

## 2022-07-13 DIAGNOSIS — A69.20 LYME DISEASE: Primary | ICD-10-CM

## 2022-07-13 LAB
B BURGDOR IGG SERPL QL IA: 0.04 INDEX
B BURGDOR IGG SERPL QL IA: NONREACTIVE
B BURGDOR IGG+IGM SER QL: 1.77
B BURGDOR IGM SERPL QL IA: 5.02 INDEX
B BURGDOR IGM SERPL QL IA: REACTIVE

## 2022-07-13 RX ORDER — DOXYCYCLINE 100 MG/1
100 CAPSULE ORAL 2 TIMES DAILY
Qty: 42 CAPSULE | Refills: 0 | Status: SHIPPED | OUTPATIENT
Start: 2022-07-13 | End: 2022-08-03

## 2022-07-14 NOTE — TELEPHONE ENCOUNTER
Dr Grewal,  Please see MyChart message from patient needing provider direction.    Please respond directly to patient if appropriate.    MIRA PearsonN, RN  [facility]

## 2022-09-18 ENCOUNTER — HEALTH MAINTENANCE LETTER (OUTPATIENT)
Age: 57
End: 2022-09-18

## 2023-01-16 DIAGNOSIS — E03.8 CENTRAL HYPOTHYROIDISM: ICD-10-CM

## 2023-01-17 RX ORDER — LEVOTHYROXINE SODIUM 137 UG/1
TABLET ORAL
Qty: 90 TABLET | Refills: 1 | Status: SHIPPED | OUTPATIENT
Start: 2023-01-17

## 2023-01-17 NOTE — TELEPHONE ENCOUNTER
"Routing refill request to provider for review/approval because:  Labs out of range:  tsh    Last Written Prescription Date:  7/26/22  Last Fill Quantity: 90,  # refills: 1   Last office visit provider:  7/12/22     Requested Prescriptions   Pending Prescriptions Disp Refills     levothyroxine (SYNTHROID/LEVOTHROID) 137 MCG tablet [Pharmacy Med Name: LEVOTHYROXINE 0.137MG (137MCG) TAB] 90 tablet 1     Sig: TAKE 1 TABLET(137 MCG) BY MOUTH BEFORE BREAKFAST       Thyroid Protocol Failed - 1/16/2023  9:41 AM        Failed - Normal TSH on file in past 12 months     Recent Labs   Lab Test 05/16/22  1029   TSH <0.01*              Passed - Patient is 12 years or older        Passed - Recent (12 mo) or future (30 days) visit within the authorizing provider's specialty     Patient has had an office visit with the authorizing provider or a provider within the authorizing providers department within the previous 12 mos or has a future within next 30 days. See \"Patient Info\" tab in inbasket, or \"Choose Columns\" in Meds & Orders section of the refill encounter.              Passed - Medication is active on med list        Passed - No active pregnancy on record     If patient is pregnant or has had a positive pregnancy test, please check TSH.          Passed - No positive pregnancy test in past 12 months     If patient is pregnant or has had a positive pregnancy test, please check TSH.               Letty Hastings RN 01/17/23 10:56 AM  "

## 2023-05-02 ENCOUNTER — TRANSFERRED RECORDS (OUTPATIENT)
Dept: HEALTH INFORMATION MANAGEMENT | Facility: CLINIC | Age: 58
End: 2023-05-02
Payer: COMMERCIAL

## 2023-05-09 DIAGNOSIS — F51.01 PRIMARY INSOMNIA: ICD-10-CM

## 2023-05-10 RX ORDER — TRAZODONE HYDROCHLORIDE 50 MG/1
TABLET, FILM COATED ORAL
Qty: 90 TABLET | Refills: 0 | Status: SHIPPED | OUTPATIENT
Start: 2023-05-10

## 2023-05-10 NOTE — TELEPHONE ENCOUNTER
"Last Written Prescription Date:  06/07/2022  Last Fill Quantity: 90,  # refills: 3   Last office visit provider:  07/12/2022     Requested Prescriptions   Pending Prescriptions Disp Refills     traZODone (DESYREL) 50 MG tablet [Pharmacy Med Name: TRAZODONE 50MG TABLETS] 90 tablet 3     Sig: TAKE 1 TABLET(50 MG) BY MOUTH AT BEDTIME       Serotonin Modulators Passed - 5/10/2023 11:37 AM        Passed - Recent (12 mo) or future (30 days) visit within the authorizing provider's specialty     Patient has had an office visit with the authorizing provider or a provider within the authorizing providers department within the previous 12 mos or has a future within next 30 days. See \"Patient Info\" tab in inbasket, or \"Choose Columns\" in Meds & Orders section of the refill encounter.              Passed - Medication is active on med list        Passed - Patient is age 18 or older        Passed - No active pregnancy on record        Passed - No positive pregnancy test in past 12 months             Hanny Watson RN 05/10/23 11:38 AM  "

## 2023-07-30 ENCOUNTER — HEALTH MAINTENANCE LETTER (OUTPATIENT)
Age: 58
End: 2023-07-30

## 2024-05-05 ENCOUNTER — HEALTH MAINTENANCE LETTER (OUTPATIENT)
Age: 59
End: 2024-05-05

## 2024-09-22 ENCOUNTER — HEALTH MAINTENANCE LETTER (OUTPATIENT)
Age: 59
End: 2024-09-22